# Patient Record
Sex: FEMALE | Race: WHITE
[De-identification: names, ages, dates, MRNs, and addresses within clinical notes are randomized per-mention and may not be internally consistent; named-entity substitution may affect disease eponyms.]

---

## 2018-11-23 ENCOUNTER — HOSPITAL ENCOUNTER (EMERGENCY)
Dept: HOSPITAL 63 - ER | Age: 28
Discharge: HOME | End: 2018-11-23
Payer: COMMERCIAL

## 2018-11-23 VITALS — WEIGHT: 258 LBS | BODY MASS INDEX: 42.99 KG/M2 | HEIGHT: 65 IN

## 2018-11-23 VITALS — SYSTOLIC BLOOD PRESSURE: 122 MMHG | DIASTOLIC BLOOD PRESSURE: 70 MMHG

## 2018-11-23 DIAGNOSIS — R07.89: Primary | ICD-10-CM

## 2018-11-23 LAB
ALBUMIN SERPL-MCNC: 3.6 G/DL (ref 3.4–5)
ALBUMIN/GLOB SERPL: 1 {RATIO} (ref 1–1.7)
ALP SERPL-CCNC: 50 U/L (ref 46–116)
ALT SERPL-CCNC: 30 U/L (ref 14–59)
ANION GAP SERPL CALC-SCNC: 11 MMOL/L (ref 6–14)
AST SERPL-CCNC: 18 U/L (ref 15–37)
BASOPHILS # BLD AUTO: 0.1 X10^3/UL (ref 0–0.2)
BASOPHILS NFR BLD: 1 % (ref 0–3)
BILIRUB SERPL-MCNC: 0.5 MG/DL (ref 0.2–1)
BUN/CREAT SERPL: 14 (ref 6–20)
CA-I SERPL ISE-MCNC: 14 MG/DL (ref 7–20)
CALCIUM SERPL-MCNC: 9.2 MG/DL (ref 8.5–10.1)
CHLORIDE SERPL-SCNC: 102 MMOL/L (ref 98–107)
CO2 SERPL-SCNC: 27 MMOL/L (ref 21–32)
CREAT SERPL-MCNC: 1 MG/DL (ref 0.6–1)
EOSINOPHIL NFR BLD: 0.1 X10^3/UL (ref 0–0.7)
EOSINOPHIL NFR BLD: 1 % (ref 0–3)
ERYTHROCYTE [DISTWIDTH] IN BLOOD BY AUTOMATED COUNT: 13 % (ref 11.5–14.5)
GFR SERPLBLD BASED ON 1.73 SQ M-ARVRAT: 66.5 ML/MIN
GLOBULIN SER-MCNC: 3.5 G/DL (ref 2.2–3.8)
GLUCOSE SERPL-MCNC: 87 MG/DL (ref 70–99)
HCT VFR BLD CALC: 42.7 % (ref 36–47)
HGB BLD-MCNC: 14.3 G/DL (ref 12–15.5)
LIPASE: 153 U/L (ref 73–393)
LYMPHOCYTES # BLD: 1.7 X10^3/UL (ref 1–4.8)
LYMPHOCYTES NFR BLD AUTO: 24 % (ref 24–48)
MAGNESIUM SERPL-MCNC: 2 MG/DL (ref 1.8–2.4)
MCH RBC QN AUTO: 28 PG (ref 25–35)
MCHC RBC AUTO-ENTMCNC: 34 G/DL (ref 31–37)
MCV RBC AUTO: 85 FL (ref 79–100)
MONO #: 0.6 X10^3/UL (ref 0–1.1)
MONOCYTES NFR BLD: 8 % (ref 0–9)
NEUT #: 4.7 X10^3UL (ref 1.8–7.7)
NEUTROPHILS NFR BLD AUTO: 65 % (ref 31–73)
PLATELET # BLD AUTO: 329 X10^3/UL (ref 140–400)
POTASSIUM SERPL-SCNC: 3.9 MMOL/L (ref 3.5–5.1)
PROT SERPL-MCNC: 7.1 G/DL (ref 6.4–8.2)
RBC # BLD AUTO: 5.03 X10^6/UL (ref 3.5–5.4)
SODIUM SERPL-SCNC: 140 MMOL/L (ref 136–145)
WBC # BLD AUTO: 7.2 X10^3/UL (ref 4–11)

## 2018-11-23 PROCEDURE — 83690 ASSAY OF LIPASE: CPT

## 2018-11-23 PROCEDURE — 96374 THER/PROPH/DIAG INJ IV PUSH: CPT

## 2018-11-23 PROCEDURE — 99284 EMERGENCY DEPT VISIT MOD MDM: CPT

## 2018-11-23 PROCEDURE — 85379 FIBRIN DEGRADATION QUANT: CPT

## 2018-11-23 PROCEDURE — 36415 COLL VENOUS BLD VENIPUNCTURE: CPT

## 2018-11-23 PROCEDURE — 71045 X-RAY EXAM CHEST 1 VIEW: CPT

## 2018-11-23 PROCEDURE — 83880 ASSAY OF NATRIURETIC PEPTIDE: CPT

## 2018-11-23 PROCEDURE — 85025 COMPLETE CBC W/AUTO DIFF WBC: CPT

## 2018-11-23 PROCEDURE — 85610 PROTHROMBIN TIME: CPT

## 2018-11-23 PROCEDURE — 84484 ASSAY OF TROPONIN QUANT: CPT

## 2018-11-23 PROCEDURE — 93005 ELECTROCARDIOGRAM TRACING: CPT

## 2018-11-23 PROCEDURE — 80053 COMPREHEN METABOLIC PANEL: CPT

## 2018-11-23 PROCEDURE — 83735 ASSAY OF MAGNESIUM: CPT

## 2018-11-23 NOTE — PHYS DOC
Past History


Past Medical History:  No Pertinent History


Past Surgical History:  No Surgical History


Alcohol Use:  Rarely


Drug Use:  None





Adult General


Chief Complaint


Chief Complaint:  CHEST PAIN





HPI


HPI





Patient is a 28 year old [female] who presents with chest pain. This is in her 

left upper chest  that has been present for the past 3 days. Became worse 

today. Worse with deep breaths. No association with activity. No radiation of 

the discomfort. Patient describes it as sore and mild to moderate in intensity. 

Patient denies any cough or fever. Denies any trauma. Denies any stasis or 

known hypercoagulable state. No previous history of this. Patient has taken 

antacids at the onset because of thinking that this was reflux however there 

was no change in the pain with the antacids.





No family history of blood clots, family history is significant for diabetes[]





Review of Systems


Review of Systems





Constitutional: Denies fever or chills []


Eyes: Denies change in visual acuity, redness, or eye pain []


HENT: Denies nasal congestion or sore throat []


Respiratory: Denies cough or shortness of breath []


Cardiovascular: No additional information not addressed in HPI []


GI: Denies abdominal pain, nausea, vomiting, bloody stools or diarrhea []


: Denies dysuria or hematuria []


Musculoskeletal: Denies back pain or joint pain []


Integument: Denies rash or skin lesions []


Neurologic: Denies headache, focal weakness or sensory changes []


Endocrine: Denies polyuria or polydipsia []





All other systems were reviewed and found to be within normal limits, except as 

documented in this note.





Allergies


Allergies





Allergies








Coded Allergies Type Severity Reaction Last Updated Verified


 


  No Known Drug Allergies    11/23/18 No











Physical Exam


Physical Exam





Constitutional: Well developed, well nourished, no acute distress, non-toxic 

appearance. []


HENT: Normocephalic, atraumatic, bilateral external ears normal, oropharynx 

moist, no oral exudates, nose normal. []


Eyes: PERRLA, EOMI, conjunctiva normal, no discharge. [] 


Neck: Normal range of motion, no tenderness, supple, no stridor. [] 


Cardiovascular:Heart rate regular rhythm, no murmur []


Lungs & Thorax:  Bilateral breath sounds clear to auscultation, no chest 

tenderness to palpation, no crepitus, no flail segment []


Abdomen: Bowel sounds normal, soft, no tenderness, no masses, no pulsatile 

masses. [] 


Skin: Warm, dry, no erythema, no rash. [] 


Back: No tenderness, no CVA tenderness. [] 


Extremities: No tenderness, no cyanosis, no clubbing, ROM intact, no edema. [] 


Neurologic: Alert and oriented X 3, normal motor function, normal sensory 

function, no focal deficits noted. []


Psychologic: Affect normal, judgement normal, mood normal. []





Current Patient Data


Vital Signs





 Vital Signs








  Date Time  Temp Pulse Resp B/P (MAP) Pulse Ox O2 Delivery O2 Flow Rate FiO2


 


11/23/18 14:50  82 24  99 Room Air  











EKG


EKG


EKG shows a normal sinus rhythm, no ST elevation, axis of 21, QTc of 407 ms[]





Radiology/Procedures


Radiology/Procedures


Chest x-ray shows no acute features[]





Course & Med Decision Making


Course & Med Decision Making


Pertinent Labs and Imaging studies reviewed. (See chart for details)





Medical decision making: There does not appear to be a pneumonia, pneumothorax, 

no evidence of pulmonary embolism, no evidence of acute coronary syndrome, no 

esophageal rupture based on history.





ED course: Patient arrived, was placed in bed, and tolerated exam well. After 

the return of laboratory and imaging findings, these were relayed to patient 

along with plan going forward. All questions were answered.[]





Dragon Disclaimer


Dragon Disclaimer


This electronic medical record was generated, in whole or in part, using a 

voice recognition dictation system.





Departure


Departure:


Impression:  


 Primary Impression:  


 Chest pain


Disposition:  01 HOME, SELF-CARE


Condition:  GOOD


Referrals:  


PCP,NO (PCP)


Patient Instructions:  Chest Pain (Nonspecific), Form - Excuse from Work, School

, or Physical Activity





Additional Instructions:  


Follow-up with your regular doctor in 2 days. Return to the ER if worsening 

chest discomfort, difficulty breathing, or any other concerns.


Scripts


Tramadol Hcl (TRAMADOL HCL) 50 Mg Tablet


50 MG PO PRN Q6HRS PRN for PAIN, #20 TAB


   Prov: DULCE KRISHNA DO         11/23/18 


Meloxicam (MELOXICAM) 7.5 Mg Tablet


7.5 MG PO DAILY for PAIN, #20 TAB


   Prov: DULCE KRISHNA DO         11/23/18





Problem Qualifiers








 Primary Impression:  


 Chest pain


 Chest pain type:  unspecified  Qualified Codes:  R07.9 - Chest pain, 

unspecified








DULCE KRISHNA DO Nov 23, 2018 15:25

## 2018-11-23 NOTE — RAD
Portable chest, 11/23/2018:

 

HISTORY: Chest pain

 

The heart size and pulmonary vascularity are normal. No pulmonary 

infiltrate is seen. There is no evidence of pleural fluid.

 

IMPRESSION: No acute cardiopulmonary abnormality is detected.

 

Electronically signed by: Rick Moritz, MD (11/23/2018 4:28 PM) [facility]

## 2018-11-23 NOTE — EKG
[facility] [address], [place] [address]

Test Date:    2018               Test Time:    15:00:01

Pat Name:     ANTHONY MATTHEWS           Department:   

Patient ID:   [id number]           Room:          

Gender:       F                        Technician:   

:          [id number]               Requested By: DULCE KRISHNA

Order Number: [id number]            Reading MD:   Andres Deutsch MD

                                 Measurements

Intervals                              Axis          

Rate:         78                       P:            27

GA:           138                      QRS:          21

QRSD:         82                       T:            10

QT:           354                                    

QTc:          407                                    

                           Interpretive Statements

SINUS RHYTHM



Electronically Signed On 2018 8:40:11 CST by Andres Deutsch MD